# Patient Record
Sex: FEMALE | URBAN - METROPOLITAN AREA
[De-identification: names, ages, dates, MRNs, and addresses within clinical notes are randomized per-mention and may not be internally consistent; named-entity substitution may affect disease eponyms.]

---

## 2022-11-22 ENCOUNTER — OFFICE VISIT (OUTPATIENT)
Dept: URBAN - METROPOLITAN AREA CLINIC 58 | Facility: CLINIC | Age: 77
End: 2022-11-22
Payer: MEDICARE

## 2022-11-22 DIAGNOSIS — H04.123 DRY EYE SYNDROME OF BILATERAL LACRIMAL GLANDS: ICD-10-CM

## 2022-11-22 DIAGNOSIS — H40.023 OPEN ANGLE WITH BORDERLINE FINDINGS, HIGH RISK, BILATERAL: ICD-10-CM

## 2022-11-22 DIAGNOSIS — H25.13 AGE-RELATED NUCLEAR CATARACT, BILATERAL: ICD-10-CM

## 2022-11-22 DIAGNOSIS — H05.89 OTHER DISORDERS OF ORBIT: Primary | ICD-10-CM

## 2022-11-22 PROCEDURE — 92133 CPTRZD OPH DX IMG PST SGM ON: CPT | Performed by: OPHTHALMOLOGY

## 2022-11-22 PROCEDURE — 99203 OFFICE O/P NEW LOW 30 MIN: CPT | Performed by: OPHTHALMOLOGY

## 2022-11-22 ASSESSMENT — INTRAOCULAR PRESSURE
OS: 16
OD: 16

## 2022-11-22 ASSESSMENT — VISUAL ACUITY
OD: 20/20
OS: 20/20

## 2022-11-22 NOTE — IMPRESSION/PLAN
Impression: Open angle with borderline findings, high risk, bilateral: H40.023. Plan: Discussed diagnosis in detail with patient. Large C/D ratios. Stable IOP. OCT ON ordered and performed today shows normal NFL OU. Observation recommended.

## 2022-11-22 NOTE — IMPRESSION/PLAN
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 Plan: Prolapsed orbital fat OS. Diagnosis discussed. No treatment required. Monitor.